# Patient Record
Sex: FEMALE | Race: BLACK OR AFRICAN AMERICAN | Employment: UNEMPLOYED | URBAN - METROPOLITAN AREA
[De-identification: names, ages, dates, MRNs, and addresses within clinical notes are randomized per-mention and may not be internally consistent; named-entity substitution may affect disease eponyms.]

---

## 2022-03-30 ENCOUNTER — HOSPITAL ENCOUNTER (EMERGENCY)
Age: 32
Discharge: HOME | End: 2022-03-30
Attending: EMERGENCY MEDICINE

## 2022-03-30 VITALS
TEMPERATURE: 98.2 F | OXYGEN SATURATION: 100 % | WEIGHT: 208.2 LBS | HEART RATE: 83 BPM | RESPIRATION RATE: 16 BRPM | BODY MASS INDEX: 39.31 KG/M2 | HEIGHT: 61 IN | SYSTOLIC BLOOD PRESSURE: 127 MMHG | DIASTOLIC BLOOD PRESSURE: 81 MMHG

## 2022-03-30 DIAGNOSIS — K02.9 DENTAL CARIES: Primary | ICD-10-CM

## 2022-03-30 PROCEDURE — 6370000000 HC RX 637 (ALT 250 FOR IP): Performed by: EMERGENCY MEDICINE

## 2022-03-30 PROCEDURE — 99283 EMERGENCY DEPT VISIT LOW MDM: CPT

## 2022-03-30 RX ORDER — NAPROXEN 500 MG/1
500 TABLET ORAL ONCE
Status: COMPLETED | OUTPATIENT
Start: 2022-03-30 | End: 2022-03-30

## 2022-03-30 RX ORDER — PENICILLIN V POTASSIUM 500 MG/1
500 TABLET ORAL 4 TIMES DAILY
Qty: 28 TABLET | Refills: 0 | Status: SHIPPED | OUTPATIENT
Start: 2022-03-30 | End: 2022-04-06

## 2022-03-30 RX ORDER — PENICILLIN V POTASSIUM 250 MG/1
500 TABLET ORAL ONCE
Status: COMPLETED | OUTPATIENT
Start: 2022-03-30 | End: 2022-03-30

## 2022-03-30 RX ORDER — NAPROXEN 500 MG/1
500 TABLET ORAL 2 TIMES DAILY
Qty: 20 TABLET | Refills: 0 | Status: SHIPPED | OUTPATIENT
Start: 2022-03-30 | End: 2022-04-09

## 2022-03-30 RX ADMIN — PENICILLIN V POTASSIUM 500 MG: 250 TABLET, FILM COATED ORAL at 22:05

## 2022-03-30 RX ADMIN — NAPROXEN 500 MG: 500 TABLET ORAL at 22:05

## 2022-03-30 ASSESSMENT — PAIN DESCRIPTION - LOCATION: LOCATION: TEETH

## 2022-03-30 ASSESSMENT — PAIN DESCRIPTION - PAIN TYPE: TYPE: ACUTE PAIN

## 2022-03-30 ASSESSMENT — PAIN SCALES - GENERAL: PAINLEVEL_OUTOF10: 8

## 2022-03-30 ASSESSMENT — PAIN DESCRIPTION - FREQUENCY: FREQUENCY: CONTINUOUS

## 2022-03-30 ASSESSMENT — PAIN DESCRIPTION - ORIENTATION: ORIENTATION: LEFT

## 2022-03-31 NOTE — ED PROVIDER NOTES
[address]   eMERGENCY dEPARTMENT eNCOUnter      Pt Name: Marin Mejias  MRN: [id number]  [place] 1990  Date of evaluation: 3/30/2022  Provider: Fred Wills MD  PCP: No primary care provider on file. CHIEF COMPLAINT       Tooth pain    HISTORY OFPRESENT ILLNESS   (Location/Symptom, Timing/Onset, Context/Setting, Quality, Duration, Modifying Factors,Severity)  Note limiting factors. Marin Mejias is a 33 y.o. female presents with complaints of tooth pain in the right left lower jaw specially in the back she has had it for about 3 days she has not seen a dentist she is not on antiantibiotics she has no difficulty chewing but she does have heat and cold intolerance and she rates the pain at about a 5 out of 10    Nursing Notes were all reviewed and agreed with or any disagreements were addressed  in the HPI. REVIEW OF SYSTEMS    (2-9 systems for level 4, 10 or more for level 5)     Review of Systems    Positives and Pertinent negatives as per HPI. Except as noted above in the ROS, all other systems were reviewed andnegative. PASTMEDICAL HISTORY   No past medical history on file. SURGICAL HISTORY     No past surgical history on file. CURRENT MEDICATIONS       Previous Medications    No medications on file       ALLERGIES     Patient has no allergy information on record. FAMILY HISTORY     No family history on file.        SOCIAL HISTORY       Social History     Socioeconomic History    Marital status: Single     Spouse name: Not on file    Number of children: Not on file    Years of education: Not on file    Highest education level: Not on file   Occupational History    Not on file   Tobacco Use    Smoking status: Not on file    Smokeless tobacco: Not on file   Substance and Sexual Activity    Alcohol use: Not on file    Drug use: Not on file    Sexual activity: Not on file   Other Topics Concern    Not on file   Social History Narrative    Not on file     Social Determinants of Health     Financial Resource Strain:     Difficulty of Paying Living Expenses: Not on file   Food Insecurity:     Worried About Running Out of Food in the Last Year: Not on file    [place] of Food in the Last Year: Not on file   Transportation Needs:     Lack of Transportation (Medical): Not on file    Lack of Transportation (Non-Medical): Not on file   Physical Activity:     Days of Exercise per Week: Not on file    Minutes of Exercise per Session: Not on file   Stress:     Feeling of Stress : Not on file   Social Connections:     Frequency of Communication with Friends and Family: Not on file    Frequency of Social Gatherings with Friends and Family: Not on file    Attends Orthodox Services: Not on file    Active Member of [address] [place] [address] [employer] or Organizations: Not on file    Attends Club or Organization Meetings: Not on file    Marital Status: Not on file   Intimate Partner Violence:     Fear of Current or Ex-Partner: Not on file    Emotionally Abused: Not on file    Physically Abused: Not on file    Sexually Abused: Not on file   Housing Stability:     Unable to Pay for Housing in the Last Year: Not on file    Number of [place] in the Last Year: Not on file    Unstable Housing in the Last Year: Not on file       SCREENINGS      @FLOW([id number])@      PHYSICAL EXAM    (up to 7 for level 4, 8 or more for level 5)     ED Triage Vitals [03/30/22 2145]   BP Temp Temp Source Pulse Resp SpO2 Height Weight   127/81 98.2 °F (36.8 °C) Oral 83 16 100 % 5' 1\" (1.549 m) 208 lb 3.2 oz (94.4 kg)       Physical Exam      General Appearance:  Alert, cooperative, no distress, appears stated age. Head:  Normocephalic, without obviousabnormality, atraumatic. Eyes:  conjunctiva/corneas clear, EOM's intact. Sclera anicteric.    ENT: Mucous membranes moist.  Full dental caries no signs of any periostitis there does not appear to be any serious decay at tooth #15 and 16 where she is complaining of most of her pain   Neck: Supple, symmetrical, trachea midline, no adenopathy. No jugular venous distention. Lungs:   Clear to auscultation bilaterally, respirationsunlabored. No rales, rhonchi or wheezes. Chest Wall:  No tenderness. Heart:  Regular rate and rhythm, S1 and S2 normal, no murmur, rub or gallop. Abdomen:   Soft, non-tender, bowel sounds active,   no masses, no organomegaly. Extremities: No edema, cords or calf tenderness. Full range of motion. Pulses: 2+ and symmetric   Skin: Turgor is normal, no rashes or lesions. Neurologic: Alert and oriented X 3. No focal findings. Motor grossly normal.  Speech clear, no drift, CN III-XII grossly intact,        DIAGNOSTIC RESULTS   LABS:    Labs Reviewed - No data to display    All other labs were within normal range or not returned as of this dictation. EKG: All EKG's are interpreted by the Emergency Department Physician who eithersigns or Co-signs this chart in the absence of a cardiologist.        RADIOLOGY:   Non-plain film images such as CT, Ultrasound and MRI are read by the radiologist. Plain radiographic images are visualized by myself. *    Interpretation per the Radiologist below, if available at the time of this note:    No orders to display         PROCEDURES   Unless otherwise noted below, none     Procedures    *    CRITICAL CARE TIME   N/A      EMERGENCY DEPARTMENT COURSE and DIFFERENTIALDIAGNOSIS/MDM:   Vitals:    Vitals:    03/30/22 2145   BP: 127/81   Pulse: 83   Resp: 16   Temp: 98.2 °F (36.8 °C)   TempSrc: Oral   SpO2: 100%   Weight: 208 lb 3.2 oz (94.4 kg)   Height: 5' 1\" (1.549 m)       Patient was given thefollowing medications:  Medications   penicillin v potassium (VEETID) tablet 500 mg (has no administration in time range)   naproxen (NAPROSYN) tablet 500 mg (has no administration in time range)           The patient tolerated their visit well.    The patient and / or the familywere informed of the results of any tests, a time was given to answer questions. FINAL IMPRESSION      1.  Dental caries          DISPOSITION/PLAN   DISPOSITION Discharge - Pending Orders Complete 03/30/2022 09:51:13 PM  Plan is for antibiotics and analgesia and follow up with dental clinic    PATIENT REFERRED TO:  Dental clinic    In 2 days        DISCHARGE MEDICATIONS:  New Prescriptions    NAPROXEN (NAPROSYN) 500 MG TABLET    Take 1 tablet by mouth 2 times daily for 20 doses    PENICILLIN V POTASSIUM (VEETID) 500 MG TABLET    Take 1 tablet by mouth 4 times daily for 7 days       DISCONTINUED MEDICATIONS:  Discontinued Medications    No medications on file              (Please note that portions of this note were completed with a voice recognition program.  Efforts were made to edit the dictations but occasionally words are mis-transcribed.)    Joslyn Felty, MD (electronically signed)      Joslyn Felty, MD  03/30/22 [id number]

## 2024-02-28 NOTE — ED NOTES
Pt dc/d with instructions and rx's in stable condition, ambulatory to lobby. Home per ride.      Jorge L Young RN  03/30/22 1344 Clinical picture not consistent with sepsis